# Patient Record
Sex: FEMALE | Race: WHITE | Employment: OTHER | URBAN - METROPOLITAN AREA
[De-identification: names, ages, dates, MRNs, and addresses within clinical notes are randomized per-mention and may not be internally consistent; named-entity substitution may affect disease eponyms.]

---

## 2023-06-10 ENCOUNTER — HOSPITAL ENCOUNTER (EMERGENCY)
Age: 58
Discharge: HOME | End: 2023-06-11
Attending: EMERGENCY MEDICINE
Payer: MEDICARE

## 2023-06-10 VITALS
HEART RATE: 73 BPM | RESPIRATION RATE: 16 BRPM | OXYGEN SATURATION: 98 % | DIASTOLIC BLOOD PRESSURE: 97 MMHG | WEIGHT: 156 LBS | TEMPERATURE: 97.5 F | HEIGHT: 63 IN | BODY MASS INDEX: 27.64 KG/M2 | SYSTOLIC BLOOD PRESSURE: 189 MMHG

## 2023-06-10 DIAGNOSIS — L02.91 ABSCESS: Primary | ICD-10-CM

## 2023-06-10 PROCEDURE — 99283 EMERGENCY DEPT VISIT LOW MDM: CPT

## 2023-06-10 PROCEDURE — 10061 I&D ABSCESS COMP/MULTIPLE: CPT

## 2023-06-10 RX ORDER — SULFAMETHOXAZOLE AND TRIMETHOPRIM 800; 160 MG/1; MG/1
1 TABLET ORAL 2 TIMES DAILY
COMMUNITY
End: 2023-06-11 | Stop reason: ALTCHOICE

## 2023-06-10 ASSESSMENT — PAIN DESCRIPTION - LOCATION: LOCATION: BACK

## 2023-06-10 ASSESSMENT — PAIN SCALES - GENERAL
PAINLEVEL_OUTOF10: 6
PAINLEVEL_OUTOF10: 6

## 2023-06-10 ASSESSMENT — PAIN DESCRIPTION - PAIN TYPE: TYPE: ACUTE PAIN

## 2023-06-10 ASSESSMENT — PAIN DESCRIPTION - DESCRIPTORS: DESCRIPTORS: ACHING;BURNING

## 2023-06-10 ASSESSMENT — PAIN DESCRIPTION - ORIENTATION: ORIENTATION: LEFT;UPPER

## 2023-06-11 PROCEDURE — 2500000003 HC RX 250 WO HCPCS: Performed by: EMERGENCY MEDICINE

## 2023-06-11 PROCEDURE — 6370000000 HC RX 637 (ALT 250 FOR IP): Performed by: EMERGENCY MEDICINE

## 2023-06-11 RX ORDER — SACCHAROMYCES BOULARDII 250 MG
250 CAPSULE ORAL 2 TIMES DAILY
Qty: 14 CAPSULE | Refills: 0 | Status: SHIPPED | OUTPATIENT
Start: 2023-06-11 | End: 2023-06-18

## 2023-06-11 RX ORDER — DOXYCYCLINE HYCLATE 100 MG
100 TABLET ORAL 2 TIMES DAILY
Qty: 12 TABLET | Refills: 0 | Status: SHIPPED | OUTPATIENT
Start: 2023-06-11 | End: 2023-06-17

## 2023-06-11 RX ORDER — CEFDINIR 300 MG/1
600 CAPSULE ORAL ONCE
Status: COMPLETED | OUTPATIENT
Start: 2023-06-11 | End: 2023-06-11

## 2023-06-11 RX ORDER — BACITRACIN ZINC AND POLYMYXIN B SULFATE 500; 1000 [USP'U]/G; [USP'U]/G
OINTMENT TOPICAL DAILY
Qty: 28 G | Refills: 0 | Status: SHIPPED | OUTPATIENT
Start: 2023-06-11 | End: 2023-06-18

## 2023-06-11 RX ORDER — CEFDINIR 300 MG/1
600 CAPSULE ORAL NIGHTLY
Qty: 12 CAPSULE | Refills: 0 | Status: SHIPPED | OUTPATIENT
Start: 2023-06-11 | End: 2023-06-17

## 2023-06-11 RX ORDER — DOXYCYCLINE HYCLATE 100 MG
100 TABLET ORAL ONCE
Status: COMPLETED | OUTPATIENT
Start: 2023-06-11 | End: 2023-06-11

## 2023-06-11 RX ADMIN — CEFDINIR 600 MG: 300 CAPSULE ORAL at 00:36

## 2023-06-11 RX ADMIN — LIDOCAINE HYDROCHLORIDE 10 ML: 10 INJECTION, SOLUTION EPIDURAL; INFILTRATION; INTRACAUDAL; PERINEURAL at 00:36

## 2023-06-11 RX ADMIN — DOXYCYCLINE HYCLATE 100 MG: 100 TABLET, COATED ORAL at 00:36

## 2023-06-11 ASSESSMENT — PAIN - FUNCTIONAL ASSESSMENT: PAIN_FUNCTIONAL_ASSESSMENT: 0-10

## 2023-06-11 ASSESSMENT — PAIN SCALES - GENERAL: PAINLEVEL_OUTOF10: 5

## 2023-06-22 ASSESSMENT — ENCOUNTER SYMPTOMS
NAUSEA: 0
COLOR CHANGE: 1
SHORTNESS OF BREATH: 0
VOMITING: 0
ABDOMINAL PAIN: 0
COUGH: 0

## 2023-06-22 NOTE — ED PROVIDER NOTES
[id number] [facility]    Name: Sage Mauro : 1965 MRN: [id number] Date of Service: 6/10/2023    BP (!) 189/97 Comment: RN notified  Pulse 73   Temp 97.5 °F (36.4 °C) (Oral)   Resp 16   Ht 5' 3\" (1.6 m)   Wt 156 lb (70.8 kg)   SpO2 98%   BMI 27.63 kg/m²     CC: abscess    HPI: this patient is a 59 y.o. female presenting to the ED from home. Ms. Victoria Reeves tells me that for nearly the last week she has been dealing with an area of increasing pain, soreness, swelling, and warmth to her left upper back. On 2023 she went to an urgent care, she was told that she has an [place]" and she had this lesion \"drained with a needle. \"  She was prescribed a course of sulfamethoxazole-trimethoprim and discharged home. In the 48+ hours since then she has not noticed any improvement in this issue and she feels like the swelling to the site has actually continued to worsen. As she was not getting any better with time she came to this department this evening to be reevaluated. She has not appreciated other changes from her usual state of health and she denies other current complaints. She has not been using any other treatments for this condition on an outpatient basis.   _____________________________________________________________________    Past Medical History:   Diagnosis Date    Hypertension     Lung cancer ([address] 2016    Overweight     Tobacco use disorder        Past Surgical History:   Procedure Laterality Date    LUNG REMOVAL, PARTIAL      RUL lobectomy       Social History     Tobacco Use    Smoking status: Every Day     Packs/day: 0.50     Years: 40.00     Pack years: 20.00     Types: Cigarettes   Substance Use Topics    Alcohol use: Yes    Drug use: No       Family History   Problem Relation Age of Onset    Hypertension Other     Diabetes Other     Heart Disease Other      _____________________________________________________________________    Review of Systems   Constitutional:

## 2024-03-05 ENCOUNTER — HOSPITAL ENCOUNTER (EMERGENCY)
Age: 59
Discharge: HOME | End: 2024-03-05
Attending: EMERGENCY MEDICINE
Payer: MEDICARE

## 2024-03-05 VITALS
RESPIRATION RATE: 18 BRPM | BODY MASS INDEX: 25.82 KG/M2 | WEIGHT: 145.72 LBS | TEMPERATURE: 98.1 F | DIASTOLIC BLOOD PRESSURE: 76 MMHG | HEART RATE: 67 BPM | SYSTOLIC BLOOD PRESSURE: 131 MMHG | OXYGEN SATURATION: 100 % | HEIGHT: 63 IN

## 2024-03-05 DIAGNOSIS — H65.92 OTITIS MEDIA WITH EFFUSION, LEFT: Primary | ICD-10-CM

## 2024-03-05 PROCEDURE — 99282 EMERGENCY DEPT VISIT SF MDM: CPT

## 2024-03-05 ASSESSMENT — PAIN SCALES - GENERAL: PAINLEVEL_OUTOF10: 3

## 2024-03-05 ASSESSMENT — PAIN DESCRIPTION - DESCRIPTORS: DESCRIPTORS: PRESSURE

## 2024-03-05 ASSESSMENT — PAIN - FUNCTIONAL ASSESSMENT: PAIN_FUNCTIONAL_ASSESSMENT: 0-10

## 2024-03-05 ASSESSMENT — PAIN DESCRIPTION - LOCATION: LOCATION: EAR

## 2024-03-05 NOTE — ED TRIAGE NOTES
Patient presents to ED complaining of left ear fullness. Patient states the symptoms started about 1.5 weeks ago. Patient states left ear feels muffled, full, and clogged. Patient states they have been taking sudafed as needed, but has had no relief. Patient states she was on an oral antibiotic and has stopped taking it because her symptoms have not improved. Patient states she still uses prescribed ear drops occasionally. Patient states her pain is a 3/10 in the left ear. Patients vitals are stable and there are no obvious signs of distress.

## 2024-03-05 NOTE — ED TRIAGE NOTES
Nursing triage performed by SN Ai under supervision of this RN.    RN Note: Patient presents to ED complaining of left ear fullness/pressure with some pain x11 days. Patient states she has been seen and prescribed multiple antibiotics and a steroid ear drop for this issues without relief. Patient states she is also taking cetrizine and sudafed OTC without relief.    Some earwax noted to ear canal on visualization, no obvious impaction, no obvious redness noted.    Patient resting on bed, respirations even and easy at this time. No obvious distress.

## 2024-03-05 NOTE — ED NOTES
Patient ambulatory from ED. AVS provided and discussed with patient. All questions answered. Patient verbalizes understanding of discharge instructions. Respirations even and easy. No obvious distress at this time.    Patient notified that they should not drive, watch small children, swim, or participate in any other activity which is potentially dangerous while taking benedryl and some other antihistamines due to potential for drowsiness. Patient urged to consult with pharmacist about best OTC antihistamine to use and potential side effects. Patient verbalizes understanding.  
ED Record Reviewed

## 2024-03-05 NOTE — DISCHARGE INSTRUCTIONS
Continue to try to equalize the pressure.  It may take time for it to open up.  You A eustachian tube dysfunction.  Continue to chew gum.  Sudafed and antihistamine may help.

## 2024-03-05 NOTE — ED PROVIDER NOTES
eMERGENCY dEPARTMENT eNCOUnter      Pt Name: Emili Campuzano  MRN: [id number]  Birthdate 1965  Date of evaluation: 3/5/2024  Provider: AUREA LYLES MD     CHIEF COMPLAINT       Chief Complaint   Patient presents with    Ear Fullness     Patient presents to the ED complaining of left ear fullness. Patient says the symptoms started about a week and a half ago. Patient states hearing is muffled in the left ear. Patient states she was on antibiotics and stopped taking them, but is still using the ear drops. Patient states pain is a 3/10 with ringing in the ear.          HISTORY OF PRESENT ILLNESS   (Location/Symptom, Timing/Onset,Context/Setting, Quality, Duration, Modifying Factors, Severity) Note limiting factors.   HPI    Emili Campuzano is a 59 y.o. female who presents to the emergency department with left ear fullness.  Patient was seen in urgent care about a week ago was placed on antibiotic and a steroid drop.  Patient still feels some fullness she stopped taking her antibiotic.  She do not think it is infection now.  No fevers no chills.  Patient states that hearing is a little muffled.  But still able to hear from it.  Patient states that painful and a weird sensation on the outside of her ear going down her jawline.  Is been going on for about 11 days now.    Nursing Notes were reviewed.    REVIEW OFSYSTEMS    (2+ for level 4; 10+ for level 5)   Review of Systems    General: No fevers, chills or night sweats, No weight loss    Head:  No Sore throat,  No Ear Pain.  But has some fullness.    Chest:  Nontender.  No Cough, No SOB,  Chest Pain    GI: No abdominal pain or vomiting    : No dysuria or hematuria    Musculoskeletal: No unrelenting pain or night pain    Neurologic: No bowel or bladder incontinence, No saddle anesthesia, No leg weakness    All other systems reviewed and are negative.        PAST MEDICAL HISTORY     Past Medical History:   Diagnosis Date    Hypertension     Lung cancer ([facility]) 2016